# Patient Record
Sex: MALE | Race: WHITE | Employment: UNEMPLOYED | URBAN - METROPOLITAN AREA
[De-identification: names, ages, dates, MRNs, and addresses within clinical notes are randomized per-mention and may not be internally consistent; named-entity substitution may affect disease eponyms.]

---

## 2017-03-06 ENCOUNTER — OFFICE VISIT (OUTPATIENT)
Dept: FAMILY MEDICINE CLINIC | Age: 62
End: 2017-03-06

## 2017-03-06 VITALS
WEIGHT: 200 LBS | DIASTOLIC BLOOD PRESSURE: 78 MMHG | SYSTOLIC BLOOD PRESSURE: 141 MMHG | HEART RATE: 62 BPM | HEIGHT: 69 IN | TEMPERATURE: 97.2 F | BODY MASS INDEX: 29.62 KG/M2

## 2017-03-06 DIAGNOSIS — J30.1 SEASONAL ALLERGIC RHINITIS DUE TO POLLEN: ICD-10-CM

## 2017-03-06 DIAGNOSIS — G47.09 OTHER INSOMNIA: Primary | ICD-10-CM

## 2017-03-06 DIAGNOSIS — Z13.9 SCREENING: ICD-10-CM

## 2017-03-06 DIAGNOSIS — Z71.89 COUNSELING AND COORDINATION OF CARE: Primary | ICD-10-CM

## 2017-03-06 DIAGNOSIS — N28.1 RENAL CYST, RIGHT: ICD-10-CM

## 2017-03-06 PROBLEM — N28.89 RIGHT RENAL MASS: Status: ACTIVE | Noted: 2017-03-06

## 2017-03-06 LAB
BILIRUB UR QL STRIP: NEGATIVE
GLUCOSE POC: NORMAL MG/DL
GLUCOSE UR-MCNC: NEGATIVE MG/DL
KETONES P FAST UR STRIP-MCNC: NEGATIVE MG/DL
PH UR STRIP: 5 [PH] (ref 4.6–8)
PROT UR QL STRIP: NEGATIVE MG/DL
SP GR UR STRIP: 1.01 (ref 1–1.03)
UA UROBILINOGEN AMB POC: NORMAL (ref 0.2–1)
URINALYSIS CLARITY POC: CLEAR
URINALYSIS COLOR POC: YELLOW
URINE BLOOD POC: NEGATIVE
URINE LEUKOCYTES POC: NEGATIVE
URINE NITRITES POC: NEGATIVE

## 2017-03-06 RX ORDER — FLUTICASONE PROPIONATE 50 MCG
SPRAY, SUSPENSION (ML) NASAL
Qty: 1 BOTTLE | Refills: 5 | Status: SHIPPED | OUTPATIENT
Start: 2017-03-06

## 2017-03-06 NOTE — PROGRESS NOTES
Met with Srikanth Roberts and explained the process of the Care Card and gave him [facility] number to call for screening. Gautam Pickens

## 2017-03-06 NOTE — PROGRESS NOTES
Results for orders placed or performed in visit on 03/06/17   AMB POC GLUCOSE BLOOD, BY GLUCOSE MONITORING DEVICE   Result Value Ref Range    Glucose  nonfasting mg/dL     Coordination of Care  1. Have you been to the ER, urgent care clinic since your last visit? Hospitalized since your last visit? No    2. Have you seen or consulted any other health care providers outside of the [address] [place] [address] since your last visit? Include any pap smears or colon screening.  No    Medications  Medication Reconciliation Performed: no  Patient does not know need refills     Learning Assessment Complete? yes  Results for orders placed or performed in visit on 03/06/17   AMB POC GLUCOSE BLOOD, BY GLUCOSE MONITORING DEVICE   Result Value Ref Range    Glucose  nonfasting mg/dL   AMB POC URINALYSIS DIP STICK MANUAL W/O MICRO   Result Value Ref Range    Color (UA POC) Yellow     Clarity (UA POC) Clear     Glucose (UA POC) Negative Negative    Bilirubin (UA POC) Negative Negative    Ketones (UA POC) Negative Negative    Specific gravity (UA POC) 1.015 1.001 - 1.035    Blood (UA POC) Negative Negative    pH (UA POC) 5 4.6 - 8.0    Protein (UA POC) Negative Negative mg/dL    Urobilinogen (UA POC) 0.2 mg/dL 0.2 - 1    Nitrites (UA POC) Negative Negative    Leukocyte esterase (UA POC) Negative Negative

## 2017-03-07 NOTE — PROGRESS NOTES
HISTORY OF PRESENT ILLNESS  Oxana Carter is a 63 y.o. male. HPI Comments: 1. Difficulty sleeping for the last 2 weeks. Has been upset about the deportations; has been in [place] 10 years, and was in [place] 13, has a family here and being deported would be very hard for him. Awakens about 1am and can't fall asleep til about 4am.  Doesn't think he is depressed. 2.  Allergic rhinitis sx usually start about now, and last year he tried claritin, zyrtec and a nasal spray and none helped scx. 3.  Lab results--  Has prediabetes, has an exophytic growth on right kidney by CT that radiology rec recheck in 6 months (june). Sleep Problem     Other         ROS    Physical Exam   Constitutional: He appears well-developed and well-nourished. No distress. No exam done. Psychiatric: He has a normal mood and affect. ASSESSMENT and PLan  1. Allergies-- chlortrimaton at hs, which should also help sleep. flonase bid. 2.  Insomnia-- if the AH doesn't resolve the insomnia, should try melatonin. 3.  Discussed the CT result and that he needs repeat 6/17. Discussed the prediabetes briefly. F/u 3 months so we can get the scan ordered.